# Patient Record
Sex: FEMALE | Race: WHITE | URBAN - METROPOLITAN AREA
[De-identification: names, ages, dates, MRNs, and addresses within clinical notes are randomized per-mention and may not be internally consistent; named-entity substitution may affect disease eponyms.]

---

## 2017-03-20 ENCOUNTER — INPATIENT (INPATIENT)
Facility: HOSPITAL | Age: 42
LOS: 0 days | Discharge: HOME | DRG: 313 | End: 2017-03-21
Attending: HOSPITALIST | Admitting: HOSPITALIST
Payer: MEDICAID

## 2017-03-20 VITALS
TEMPERATURE: 98 F | HEART RATE: 71 BPM | DIASTOLIC BLOOD PRESSURE: 77 MMHG | RESPIRATION RATE: 18 BRPM | WEIGHT: 143.08 LBS | HEIGHT: 60 IN | SYSTOLIC BLOOD PRESSURE: 115 MMHG | OXYGEN SATURATION: 100 %

## 2017-03-20 DIAGNOSIS — R07.9 CHEST PAIN, UNSPECIFIED: ICD-10-CM

## 2017-03-20 DIAGNOSIS — R07.89 OTHER CHEST PAIN: ICD-10-CM

## 2017-03-20 DIAGNOSIS — E66.9 OBESITY, UNSPECIFIED: ICD-10-CM

## 2017-03-20 DIAGNOSIS — K29.70 GASTRITIS, UNSPECIFIED, WITHOUT BLEEDING: ICD-10-CM

## 2017-03-20 DIAGNOSIS — E66.8 OTHER OBESITY: ICD-10-CM

## 2017-03-20 DIAGNOSIS — Z79.01 LONG TERM (CURRENT) USE OF ANTICOAGULANTS: ICD-10-CM

## 2017-03-20 LAB
ALBUMIN SERPL ELPH-MCNC: 4.2 G/DL — SIGNIFICANT CHANGE UP (ref 3.3–5.2)
ALP SERPL-CCNC: 78 U/L — SIGNIFICANT CHANGE UP (ref 40–120)
ALT FLD-CCNC: 41 U/L — HIGH
ANION GAP SERPL CALC-SCNC: 12 MMOL/L — SIGNIFICANT CHANGE UP (ref 5–17)
APTT BLD: 27.9 SEC — SIGNIFICANT CHANGE UP (ref 27.5–37.4)
AST SERPL-CCNC: 26 U/L — SIGNIFICANT CHANGE UP
BILIRUB SERPL-MCNC: 0.2 MG/DL — LOW (ref 0.4–2)
BUN SERPL-MCNC: 10 MG/DL — SIGNIFICANT CHANGE UP (ref 8–20)
CALCIUM SERPL-MCNC: 9 MG/DL — SIGNIFICANT CHANGE UP (ref 8.6–10.2)
CHLORIDE SERPL-SCNC: 101 MMOL/L — SIGNIFICANT CHANGE UP (ref 98–107)
CK SERPL-CCNC: 115 U/L — SIGNIFICANT CHANGE UP (ref 25–170)
CO2 SERPL-SCNC: 26 MMOL/L — SIGNIFICANT CHANGE UP (ref 22–29)
CREAT SERPL-MCNC: 0.51 MG/DL — SIGNIFICANT CHANGE UP (ref 0.5–1.3)
GLUCOSE SERPL-MCNC: 89 MG/DL — SIGNIFICANT CHANGE UP (ref 70–115)
HCT VFR BLD CALC: 41.8 % — SIGNIFICANT CHANGE UP (ref 37–47)
HGB BLD-MCNC: 13.4 G/DL — SIGNIFICANT CHANGE UP (ref 12–16)
INR BLD: 1.07 RATIO — SIGNIFICANT CHANGE UP (ref 0.88–1.16)
MCHC RBC-ENTMCNC: 25.5 PG — LOW (ref 27–31)
MCHC RBC-ENTMCNC: 32.1 G/DL — SIGNIFICANT CHANGE UP (ref 32–36)
MCV RBC AUTO: 79.6 FL — LOW (ref 81–99)
PLATELET # BLD AUTO: 251 K/UL — SIGNIFICANT CHANGE UP (ref 150–400)
POTASSIUM SERPL-MCNC: 3.9 MMOL/L — SIGNIFICANT CHANGE UP (ref 3.5–5.3)
POTASSIUM SERPL-SCNC: 3.9 MMOL/L — SIGNIFICANT CHANGE UP (ref 3.5–5.3)
PROT SERPL-MCNC: 8 G/DL — SIGNIFICANT CHANGE UP (ref 6.6–8.7)
PROTHROM AB SERPL-ACNC: 11.8 SEC — SIGNIFICANT CHANGE UP (ref 10–13.1)
RBC # BLD: 5.25 M/UL — HIGH (ref 4.4–5.2)
RBC # FLD: 14.5 % — SIGNIFICANT CHANGE UP (ref 11–15.6)
SODIUM SERPL-SCNC: 139 MMOL/L — SIGNIFICANT CHANGE UP (ref 135–145)
TROPONIN T SERPL-MCNC: <0.01 NG/ML — SIGNIFICANT CHANGE UP (ref 0–0.06)
WBC # BLD: 7.6 K/UL — SIGNIFICANT CHANGE UP (ref 4.8–10.8)
WBC # FLD AUTO: 7.6 K/UL — SIGNIFICANT CHANGE UP (ref 4.8–10.8)

## 2017-03-20 PROCEDURE — 99285 EMERGENCY DEPT VISIT HI MDM: CPT

## 2017-03-20 PROCEDURE — 71010: CPT | Mod: 26

## 2017-03-20 PROCEDURE — 93010 ELECTROCARDIOGRAM REPORT: CPT

## 2017-03-20 PROCEDURE — 99223 1ST HOSP IP/OBS HIGH 75: CPT | Mod: GC

## 2017-03-20 RX ORDER — ASPIRIN/CALCIUM CARB/MAGNESIUM 324 MG
325 TABLET ORAL ONCE
Qty: 0 | Refills: 0 | Status: COMPLETED | OUTPATIENT
Start: 2017-03-20 | End: 2017-03-20

## 2017-03-20 RX ORDER — NITROGLYCERIN 6.5 MG
1 CAPSULE, EXTENDED RELEASE ORAL ONCE
Qty: 0 | Refills: 0 | Status: COMPLETED | OUTPATIENT
Start: 2017-03-20 | End: 2017-03-20

## 2017-03-20 RX ORDER — ACETAMINOPHEN 500 MG
650 TABLET ORAL EVERY 6 HOURS
Qty: 0 | Refills: 0 | Status: DISCONTINUED | OUTPATIENT
Start: 2017-03-20 | End: 2017-03-21

## 2017-03-20 RX ORDER — ENOXAPARIN SODIUM 100 MG/ML
40 INJECTION SUBCUTANEOUS EVERY 24 HOURS
Qty: 0 | Refills: 0 | Status: DISCONTINUED | OUTPATIENT
Start: 2017-03-20 | End: 2017-03-21

## 2017-03-20 RX ORDER — PANTOPRAZOLE SODIUM 20 MG/1
40 TABLET, DELAYED RELEASE ORAL
Qty: 0 | Refills: 0 | Status: DISCONTINUED | OUTPATIENT
Start: 2017-03-20 | End: 2017-03-21

## 2017-03-20 RX ADMIN — ENOXAPARIN SODIUM 40 MILLIGRAM(S): 100 INJECTION SUBCUTANEOUS at 18:28

## 2017-03-20 RX ADMIN — PANTOPRAZOLE SODIUM 40 MILLIGRAM(S): 20 TABLET, DELAYED RELEASE ORAL at 18:28

## 2017-03-20 RX ADMIN — Medication 650 MILLIGRAM(S): at 18:28

## 2017-03-20 RX ADMIN — Medication 325 MILLIGRAM(S): at 14:09

## 2017-03-20 NOTE — ED ADULT NURSE NOTE - OBJECTIVE STATEMENT
41 year old female, no acute distress, vss, complains of Left chest and Left arm pain x one week. Pain increases with lifting of left arm. Hospital  Yvan Jacques at bedside to translate. Pt place on cardiac and O2 monitor, fall precautions intiti

## 2017-03-20 NOTE — H&P ADULT - NSHPPHYSICALEXAM_GEN_ALL_CORE
Constitutional: Pt in mild distress, favoring left arm    HEAD: NC/AT    HEENT: EYES: EOMI, PERRLA; Throat: no erythema, normal appearing, no lesions, multiple fillings, moist mucous membranes    Neck:  No JVD, bruits or thyromegaly    Respiratory:  Clear without rales or rhonchi    Cardiovascular:  RR without murmur, rub or gallop.    Gastrointestinal: Soft without hepatosplenomegaly.    Extremities: without cyanosis, clubbing or edema.    Neurological:  Alert and Oriented   x     3 . CN2-12 intact. No gross sensory or motor defects.    Skin:    Psychiatric: Normal affect. Constitutional: Pt in mild distress, favoring left arm    HEAD: NC/AT    HEENT: EYES: EOMI, PERRLA; Throat: no erythema, normal appearing, no lesions, multiple fillings, moist mucous membranes    Neck:  No JVD, bruits or thyromegaly    Respiratory:  Clear without rales or rhonchi    Cardiovascular:  RR without murmur, rub or gallop.    Gastrointestinal: Soft without hepatosplenomegaly.    Extremities: without cyanosis, clubbing or edema.    Neurological:  Alert and Oriented   x     3 . CN2-12 intact. No gross sensory or motor defects.    Skin: no lesions    Psychiatric: Normal affect.

## 2017-03-20 NOTE — H&P ADULT - PROBLEM SELECTOR PLAN 1
Admit to: Dr Alamo's medicine service  Bed: any monitored bed  Diet: low sodium, low cholesterol  Activity: as tolerated  Vitals: as per routine  Labs: CBC, CMP, Troponins x3, CKMB, Mg, Phos, PT/INR, PTT, Lipid panel in AM, TSH in AM  Rx: Aspirin, Nitroglycerin 2% patch, Tylenol PRN pain  EKG  Imaging: CXR, TTE

## 2017-03-20 NOTE — ED PROVIDER NOTE - MEDICAL DECISION MAKING DETAILS
The patient sent in from the  clinic for admission for cardiac work-up.  The symptoms concerning for ACS

## 2017-03-20 NOTE — H&P ADULT - FAMILY HISTORY
Mother  Still living? Unknown  Family history of hypertension, Age at diagnosis: Age Unknown  Family history of diabetes mellitus in mother, Age at diagnosis: Age Unknown  Family history of hypercholesterolemia, Age at diagnosis: Age Unknown

## 2017-03-20 NOTE — ED PROVIDER NOTE - OBJECTIVE STATEMENT
The patient is a 41 year old female sent in from [facility] clinic complaining of CP described as pressure and radiating to L arm.  The symptom started one week ago.  The pain is on and off and today is worsening.  The patient also complaining of dizziness and mild nausea. No fever, No back pain, No HA

## 2017-03-20 NOTE — H&P ADULT - HISTORY OF PRESENT ILLNESS
42 yo female, presented at the [facility] clinic in [place] with chest pain. Pain is 10/10, in the left chest and radiates down her left arm, pressure like in character, intermittent, started 2 weeks ago but worsened today, alleviated previously with tylenol, exacerbated with movement. Pt reports that she has been having the CP even while at rest. It is associated with dizziness, subjective sweating, nausea, vomiting x1 this AM and mild HA 5/10. Pt denies visual s/s, palpitations, SOB, fever, chills.

## 2017-03-20 NOTE — ED PROVIDER NOTE - CHPI ED SYMPTOMS NEG
no chills/no fever/no syncope/no shortness of breath/no vomiting/no cough/no back pain/no diaphoresis

## 2017-03-20 NOTE — ED ADULT NURSE NOTE - LANGUAGE ASSISTANCE NEEDED
Yes-Patient/Caregiver accepts free interpretation services... Yes-Patient/Caregiver accepts free interpretation services.../Hospital  Yvan Jacques

## 2017-03-21 VITALS
RESPIRATION RATE: 18 BRPM | HEART RATE: 80 BPM | DIASTOLIC BLOOD PRESSURE: 59 MMHG | TEMPERATURE: 98 F | SYSTOLIC BLOOD PRESSURE: 102 MMHG | OXYGEN SATURATION: 99 %

## 2017-03-21 LAB
ALBUMIN SERPL ELPH-MCNC: 4.1 G/DL — SIGNIFICANT CHANGE UP (ref 3.3–5.2)
ALP SERPL-CCNC: 73 U/L — SIGNIFICANT CHANGE UP (ref 40–120)
ALT FLD-CCNC: 35 U/L — HIGH
ANION GAP SERPL CALC-SCNC: 11 MMOL/L — SIGNIFICANT CHANGE UP (ref 5–17)
AST SERPL-CCNC: 24 U/L — SIGNIFICANT CHANGE UP
BASOPHILS # BLD AUTO: 0 K/UL — SIGNIFICANT CHANGE UP (ref 0–0.2)
BASOPHILS NFR BLD AUTO: 0.4 % — SIGNIFICANT CHANGE UP (ref 0–2)
BILIRUB SERPL-MCNC: 0.4 MG/DL — SIGNIFICANT CHANGE UP (ref 0.4–2)
BUN SERPL-MCNC: 13 MG/DL — SIGNIFICANT CHANGE UP (ref 8–20)
CALCIUM SERPL-MCNC: 8.8 MG/DL — SIGNIFICANT CHANGE UP (ref 8.6–10.2)
CHLORIDE SERPL-SCNC: 102 MMOL/L — SIGNIFICANT CHANGE UP (ref 98–107)
CHOLEST SERPL-MCNC: 152 MG/DL — SIGNIFICANT CHANGE UP (ref 110–199)
CK SERPL-CCNC: 80 U/L — SIGNIFICANT CHANGE UP (ref 25–170)
CK SERPL-CCNC: 92 U/L — SIGNIFICANT CHANGE UP (ref 25–170)
CO2 SERPL-SCNC: 27 MMOL/L — SIGNIFICANT CHANGE UP (ref 22–29)
CREAT SERPL-MCNC: 0.57 MG/DL — SIGNIFICANT CHANGE UP (ref 0.5–1.3)
EOSINOPHIL # BLD AUTO: 0.3 K/UL — SIGNIFICANT CHANGE UP (ref 0–0.5)
EOSINOPHIL NFR BLD AUTO: 2.5 % — SIGNIFICANT CHANGE UP (ref 0–6)
GLUCOSE SERPL-MCNC: 83 MG/DL — SIGNIFICANT CHANGE UP (ref 70–115)
HAV IGM SER-ACNC: SIGNIFICANT CHANGE UP
HBA1C BLD-MCNC: 5.5 — SIGNIFICANT CHANGE UP (ref 4–5.6)
HBV CORE IGM SER-ACNC: SIGNIFICANT CHANGE UP
HBV SURFACE AG SER-ACNC: SIGNIFICANT CHANGE UP
HCT VFR BLD CALC: 41.5 % — SIGNIFICANT CHANGE UP (ref 37–47)
HCV AB S/CO SERPL IA: 0.18 S/CO — SIGNIFICANT CHANGE UP
HCV AB SERPL-IMP: SIGNIFICANT CHANGE UP
HDLC SERPL-MCNC: 47 MG/DL — LOW
HGB BLD-MCNC: 13.5 G/DL — SIGNIFICANT CHANGE UP (ref 12–16)
LIPID PNL WITH DIRECT LDL SERPL: 77 MG/DL — SIGNIFICANT CHANGE UP
LYMPHOCYTES # BLD AUTO: 16.5 % — LOW (ref 20–55)
LYMPHOCYTES # BLD AUTO: 2.2 K/UL — SIGNIFICANT CHANGE UP (ref 1–4.8)
MAGNESIUM SERPL-MCNC: 2.2 MG/DL — SIGNIFICANT CHANGE UP (ref 1.8–2.5)
MCHC RBC-ENTMCNC: 25.9 PG — LOW (ref 27–31)
MCHC RBC-ENTMCNC: 32.5 G/DL — SIGNIFICANT CHANGE UP (ref 32–36)
MCV RBC AUTO: 79.5 FL — LOW (ref 81–99)
MONOCYTES # BLD AUTO: 1.2 K/UL — HIGH (ref 0–0.8)
MONOCYTES NFR BLD AUTO: 9.6 % — SIGNIFICANT CHANGE UP (ref 3–10)
NEUTROPHILS # BLD AUTO: 9.2 K/UL — HIGH (ref 1.8–8)
NEUTROPHILS NFR BLD AUTO: 70.7 % — SIGNIFICANT CHANGE UP (ref 37–73)
PLATELET # BLD AUTO: 244 K/UL — SIGNIFICANT CHANGE UP (ref 150–400)
POTASSIUM SERPL-MCNC: 3.7 MMOL/L — SIGNIFICANT CHANGE UP (ref 3.5–5.3)
POTASSIUM SERPL-SCNC: 3.7 MMOL/L — SIGNIFICANT CHANGE UP (ref 3.5–5.3)
PROT SERPL-MCNC: 7.5 G/DL — SIGNIFICANT CHANGE UP (ref 6.6–8.7)
RBC # BLD: 5.22 M/UL — HIGH (ref 4.4–5.2)
RBC # FLD: 14.4 % — SIGNIFICANT CHANGE UP (ref 11–15.6)
SODIUM SERPL-SCNC: 140 MMOL/L — SIGNIFICANT CHANGE UP (ref 135–145)
TOTAL CHOLESTEROL/HDL RATIO MEASUREMENT: 3 RATIO — LOW (ref 3.3–7.1)
TRIGL SERPL-MCNC: 140 MG/DL — SIGNIFICANT CHANGE UP (ref 10–200)
TROPONIN T SERPL-MCNC: <0.01 NG/ML — SIGNIFICANT CHANGE UP (ref 0–0.06)
TROPONIN T SERPL-MCNC: <0.01 NG/ML — SIGNIFICANT CHANGE UP (ref 0–0.06)
WBC # BLD: 13 K/UL — HIGH (ref 4.8–10.8)
WBC # FLD AUTO: 13 K/UL — HIGH (ref 4.8–10.8)

## 2017-03-21 PROCEDURE — 80061 LIPID PANEL: CPT

## 2017-03-21 PROCEDURE — 99285 EMERGENCY DEPT VISIT HI MDM: CPT | Mod: 25

## 2017-03-21 PROCEDURE — 85027 COMPLETE CBC AUTOMATED: CPT

## 2017-03-21 PROCEDURE — T1013: CPT

## 2017-03-21 PROCEDURE — 80074 ACUTE HEPATITIS PANEL: CPT

## 2017-03-21 PROCEDURE — 82550 ASSAY OF CK (CPK): CPT

## 2017-03-21 PROCEDURE — 83735 ASSAY OF MAGNESIUM: CPT

## 2017-03-21 PROCEDURE — 85730 THROMBOPLASTIN TIME PARTIAL: CPT

## 2017-03-21 PROCEDURE — 83036 HEMOGLOBIN GLYCOSYLATED A1C: CPT

## 2017-03-21 PROCEDURE — 93010 ELECTROCARDIOGRAM REPORT: CPT

## 2017-03-21 PROCEDURE — 36415 COLL VENOUS BLD VENIPUNCTURE: CPT

## 2017-03-21 PROCEDURE — 84484 ASSAY OF TROPONIN QUANT: CPT

## 2017-03-21 PROCEDURE — 85610 PROTHROMBIN TIME: CPT

## 2017-03-21 PROCEDURE — 71045 X-RAY EXAM CHEST 1 VIEW: CPT

## 2017-03-21 PROCEDURE — 93005 ELECTROCARDIOGRAM TRACING: CPT

## 2017-03-21 PROCEDURE — 80053 COMPREHEN METABOLIC PANEL: CPT

## 2017-03-21 PROCEDURE — 99232 SBSQ HOSP IP/OBS MODERATE 35: CPT | Mod: GC

## 2017-03-21 RX ORDER — PANTOPRAZOLE SODIUM 20 MG/1
1 TABLET, DELAYED RELEASE ORAL
Qty: 0 | Refills: 0 | COMMUNITY
Start: 2017-03-21

## 2017-03-21 RX ORDER — ACETAMINOPHEN 500 MG
2 TABLET ORAL
Qty: 0 | Refills: 0 | COMMUNITY
Start: 2017-03-21

## 2017-03-21 RX ORDER — METHOCARBAMOL 500 MG/1
1 TABLET, FILM COATED ORAL
Qty: 18 | Refills: 0 | OUTPATIENT
Start: 2017-03-21 | End: 2017-03-24

## 2017-03-21 RX ORDER — ACETAMINOPHEN 500 MG
2 TABLET ORAL
Qty: 240 | Refills: 0 | OUTPATIENT
Start: 2017-03-21 | End: 2017-04-20

## 2017-03-21 RX ORDER — METHOCARBAMOL 500 MG/1
1 TABLET, FILM COATED ORAL
Qty: 30 | Refills: 0 | OUTPATIENT
Start: 2017-03-21 | End: 2017-03-26

## 2017-03-21 RX ORDER — PANTOPRAZOLE SODIUM 20 MG/1
1 TABLET, DELAYED RELEASE ORAL
Qty: 30 | Refills: 0 | OUTPATIENT
Start: 2017-03-21 | End: 2017-04-20

## 2017-03-21 RX ADMIN — Medication 650 MILLIGRAM(S): at 03:55

## 2017-03-21 RX ADMIN — PANTOPRAZOLE SODIUM 40 MILLIGRAM(S): 20 TABLET, DELAYED RELEASE ORAL at 08:34

## 2017-03-21 NOTE — ED ADULT NURSE REASSESSMENT NOTE - NS ED NURSE REASSESS COMMENT FT1
Pt resting comfortably in bed, pt denies any chest pain or discomfort. Pt awaiting bed, scheduled for CT spine. will continue to monitor.

## 2017-03-21 NOTE — DISCHARGE NOTE ADULT - PROVIDER TOKENS
FREE:[LAST:[HRH],FIRST:[Clinic],PHONE:[(   )    -],FAX:[(   )    -],ADDRESS:[[facility] [place] [address]    Phone: [phone]  Fax: [phone]]]

## 2017-03-21 NOTE — PROGRESS NOTE ADULT - PROBLEM SELECTOR PLAN 1
NL EKG on admission, repeat AM EKG pending  (-) troponins x 2 , 3rd trop is pending   CKMB (-) x 2, 3rd is pending  Vitals WNL and CP resolved  c/w acetaminophen   Tablet. 650milliGRAM(s) Oral every 6 hours PRN pain

## 2017-03-21 NOTE — ED ADULT NURSE REASSESSMENT NOTE - NS ED NURSE REASSESS COMMENT FT1
Pt is Aox3 in NAD. Pt denies complaint.  normal sinus on monirot in the 70s. IV clean dry and intact. Pt OOB independently. Safety maintained, Bed locked in lowest position. Call bell in reach. Pt awaiting bed placement.

## 2017-03-21 NOTE — DISCHARGE NOTE ADULT - CARE PLAN
Principal Discharge DX:	Musculoskeletal chest pain  Instructions for follow-up, activity and diet:	resolved with Tylenol. Take medications as instructed. Please follow up out patient at the [facility] at [place] if pain returns. If chest pain radiating down the left arm or left jaw returns, please take a dose of aspirin 325mg and seek medical assistance.  Secondary Diagnosis:	Gastritis  Instructions for follow-up, activity and diet:	no episodes of gastric pain during admission. Please follow up out patient at the [facility] at [place] if pain returns  Secondary Diagnosis:	Moderate obesity  Instructions for follow-up, activity and diet:	please decrease fat in diet, low fat diet is encouraged. Increase eating fruits and vegetable. Increase exercise. Principal Discharge DX:	Musculoskeletal chest pain  Goal:	Able to tolerate pain  Instructions for follow-up, activity and diet:	resolved with Tylenol. Take medications as instructed. Please follow up out patient at the [facility] at [place] if pain returns. If chest pain radiating down the left arm or left jaw returns, please take a dose of aspirin 325mg and seek medical assistance.  Secondary Diagnosis:	Gastritis  Instructions for follow-up, activity and diet:	no episodes of gastric pain during admission. Please follow up out patient at the [facility] at [place] if pain returns  Secondary Diagnosis:	Moderate obesity  Instructions for follow-up, activity and diet:	please decrease fat in diet, low fat diet is encouraged. Increase eating fruits and vegetable. Increase exercise.

## 2017-03-21 NOTE — ED ADULT NURSE REASSESSMENT NOTE - NS ED NURSE REASSESS COMMENT FT1
PT resting comfortably in NAD. Pt ambulated with a steady gait to the bathroom. Pt placed on monitor pt normal sinus 60s. Pt denies complaint. pt awaiting bed placement. safety maintain.

## 2017-03-21 NOTE — PROGRESS NOTE ADULT - ASSESSMENT
41y  Female with no PMHX, presented with CP at [facility] clinic and was sent to [facility], is admitted for R/O ACS. No events overnight, pt has no complaints, CP has resolved and Left Upper Extremity pain is currently 3/10 and located at the posterior shoulder. Pt is ambulating and denies, CP, SOB, nausea, vomiting, fever, chills, diarrhea, constipation.

## 2017-03-21 NOTE — DISCHARGE NOTE ADULT - HOSPITAL COURSE
41y  Female with no PMHX, presented with CP at [facility] clinic and was sent to [facility], was admitted for R/O ACS. NL EKG on admission, repeat AM EKG pending, (-) troponins x 2 , 3rd trop is pending, CKMB (-) x 2, 3rd is pending. Vitals overnight were WNL and CP resolved. Pt on admission complained of intermittent gastric pain and was treated with pantoprazole with no episodes of epigastric pain since. Pt is noted to be Moderately Obese with a BMI of 27.9. Pt was advised and encouraged to comply with diet and exercise. No events overnight, pt has no complaints, CP has resolved and Left Upper Extremity pain is currently 3/10 and located at the posterior shoulder. Pt is ambulating and denies, CP, SOB, nausea, vomiting, fever, chills, diarrhea, constipation. CP is likely to musculoskeletal in etiology and pt refuses further imaging for evaluation. Pt is stable and optimized for discharge and advised to follow up with her clinic stevo week for a post -hospital visit.

## 2017-03-21 NOTE — DISCHARGE NOTE ADULT - PATIENT PORTAL LINK FT
“You can access the Follow[employer] Patient Portal, offered by [facility], by registering with the following website: [email or web address]”

## 2017-03-21 NOTE — DISCHARGE NOTE ADULT - NS AS ACTIVITY OBS
Bathing allowed/Walking-Indoors allowed/Stairs allowed/Return to Work/School allowed/Driving allowed/Showering allowed/Walking-Outdoors allowed/Sex allowed

## 2017-03-21 NOTE — DISCHARGE NOTE ADULT - PLAN OF CARE
please decrease fat in diet, low fat diet is encouraged. Increase eating fruits and vegetable. Increase exercise. no episodes of gastric pain during admission. Please follow up out patient at the [facility] at [place] if pain returns resolved with Tylenol. Take medications as instructed. Please follow up out patient at the [facility] at [place] if pain returns. If chest pain radiating down the left arm or left jaw returns, please take a dose of aspirin 325mg and seek medical assistance. Able to tolerate pain

## 2017-03-21 NOTE — PROGRESS NOTE ADULT - SUBJECTIVE AND OBJECTIVE BOX
Patient is a 41y old  Female who presents with a chief complaint of Chest pain (20 Mar 2017 15:03)      INTERVAL HPI/OVERNIGHT EVENTS:  41y  Female with no PMHX, presented with CP at [facility] clinic and was sent to [facility], is admitted for R/O ACS. No events overnight, pt has no complaints, CP has resolved and Left Upper Extremity pain is currently 3/10 and located at the posterior shoulder. Pt is ambulating and denies, CP, SOB, nausea, vomiting, fever, chills, diarrhea, constipation.          Vital Signs Last 24 Hrs  T(C): 36.6, Max: 36.9 (03-20 @ 20:11)  T(F): 97.9, Max: 98.5 (03-20 @ 20:11)  HR: 76 (64 - 91)  BP: 107/55 (87/57 - 115/77)  BP(mean): --  RR: 18 (15 - 18)  SpO2: 100% (97% - 100%)      Daily Height in cm: 152.4 (20 Mar 2017 11:16)    Daily   I&O's Detail        REVIEW OF SYSTEMS:    negative except as noted above    PHYSICAL EXAM:    GENERAL: NAD, obese, well-groomed, well-developed  HEAD:  Atraumatic, Normocephalic  EYES: EOMI, PERRLA, conjunctiva and sclera clear  ENMT: No tonsillar erythema, exudates, or enlargement; Moist mucous membranes, Good dentition, No lesions  NECK: Supple, No JVD, Normal thyroid  NERVOUS SYSTEM:  Alert & Oriented X3, Good concentration; Motor Strength 5/5 B/L upper and lower extremities; DTRs 2+ intact and symmetric  CHEST/LUNG: Clear to percussion bilaterally; No rales, rhonchi, wheezing, or rubs  HEART: Regular rate and rhythm; No murmurs, rubs, or gallops  ABDOMEN: Soft, Nontender, Nondistended; Bowel sounds present  EXTREMITIES:  mild left shoulder pain, 2+ Peripheral Pulses, No clubbing, cyanosis, or edema  LYMPH: No lymphadenopathy noted  SKIN: No rashes or lesions      LABS:                        13.5   13.0  )-----------( 244      ( 21 Mar 2017 04:23 )             41.5     CBC Full  -  ( 21 Mar 2017 04:23 )  WBC Count : 13.0 K/uL  Hemoglobin : 13.5 g/dL  Hematocrit : 41.5 %  Platelet Count - Automated : 244 K/uL  Mean Cell Volume : 79.5 fl  Mean Cell Hemoglobin : 25.9 pg  Mean Cell Hemoglobin Concentration : 32.5 g/dL  Auto Neutrophil # : 9.2 K/uL  Auto Lymphocyte # : 2.2 K/uL  Auto Monocyte # : 1.2 K/uL  Auto Eosinophil # : 0.3 K/uL  Auto Basophil # : 0.0 K/uL  Auto Neutrophil % : 70.7 %  Auto Lymphocyte % : 16.5 %  Auto Monocyte % : 9.6 %  Auto Eosinophil % : 2.5 %  Auto Basophil % : 0.4 %    21 Mar 2017 04:23    140    |  102    |  13.0   ----------------------------<  83     3.7     |  27.0   |  0.57     Ca    8.8        21 Mar 2017 04:23  Mg     2.2       21 Mar 2017 04:23    TPro  7.5    /  Alb  4.1    /  TBili  0.4    /  DBili  x      /  AST  24     /  ALT  35     /  AlkPhos  73     21 Mar 2017 04:23    PT/INR - ( 20 Mar 2017 11:34 )   PT: 11.8 sec;   INR: 1.07 ratio         PTT - ( 20 Mar 2017 11:34 )  PTT:27.9 sec    Hemoglobin A1C, Whole Blood: 5.5 (03-21 @ 04:24)          Albumin, Serum: 4.1 g/dL (03-21 @ 04:23)  Albumin, Serum: 4.2 g/dL (03-20 @ 11:34)    LIVER FUNCTIONS - ( 21 Mar 2017 04:23 )  Alb: 4.1 g/dL / Pro: 7.5 g/dL / ALK PHOS: 73 U/L / ALT: 35 U/L / AST: 24 U/L / GGT: x             RADIOLOGY & ADDITIONAL TESTS:    MEDICATIONS    NEUROLOGIC:  acetaminophen   Tablet. 650milliGRAM(s) Oral every 6 hours PRN    ONCOLOGIC:    HEMATOLOGIC:  enoxaparin Injectable 40milliGRAM(s) SubCutaneous every 24 hours    GATROINTESTINAL:  pantoprazole    Tablet 40milliGRAM(s) Oral before breakfast     MEDS:    ENDO/METABOLIC:    IV FLUID/NUTRITION:    TOPICAL:    IMMUNOLOGIC & OTHER      Allergies    No Known Allergies    Intolerances

## 2017-03-21 NOTE — DISCHARGE NOTE ADULT - ADDITIONAL INSTRUCTIONS
Please follow up out patient at the [facility] at [place] in 1 week for pot- ED visit.

## 2017-03-21 NOTE — PROGRESS NOTE ADULT - PROBLEM SELECTOR PLAN 2
no episodes of epigastric pain overnight  c/w pantoprazole  Tablet 40milliGRAM(s) Oral before breakfast

## 2017-08-30 ENCOUNTER — ASOB RESULT (OUTPATIENT)
Age: 42
End: 2017-08-30

## 2017-08-30 ENCOUNTER — APPOINTMENT (OUTPATIENT)
Dept: ANTEPARTUM | Facility: CLINIC | Age: 42
End: 2017-08-30
Payer: SELF-PAY

## 2017-08-30 PROBLEM — Z00.00 ENCOUNTER FOR PREVENTIVE HEALTH EXAMINATION: Status: ACTIVE | Noted: 2017-08-30

## 2017-08-30 PROCEDURE — 76857 US EXAM PELVIC LIMITED: CPT

## 2017-08-30 PROCEDURE — 76830 TRANSVAGINAL US NON-OB: CPT

## 2019-12-23 NOTE — ED PROVIDER NOTE - ENMT, MLM
Called patient to let her know  is in surgery this morning. Patient is going to rescheduled her appointment through the portal.    Airway patent, Nasal mucosa clear. Mouth with normal mucosa. Throat has no vesicles, no oropharyngeal exudates and uvula is midline.

## 2021-07-29 NOTE — ED ADULT TRIAGE NOTE - LANGUAGE ASSISTANCE NEEDED
Pt is requesting a referral to see an OBGYN for Hormone Replacement Therapy.    Spoke with Dr. Mast, will come melissa pt Yes-Patient/Caregiver accepts free interpretation services... Per psych, pt cleared. Psych offered inpatient rehab which pt refused. Will dc with outpt mental health follow up

## 2022-02-09 NOTE — DISCHARGE NOTE ADULT - MEDICATION SUMMARY - MEDICATIONS TO TAKE
Alert-The patient is alert, awake and responds to voice. The patient is oriented to time, place, and person. The triage nurse is able to obtain subjective information.
I will START or STAY ON the medications listed below when I get home from the hospital:    acetaminophen 325 mg oral tablet  -- 2 tab(s) by mouth every 6 hours, As needed, Moderate Pain (4 - 6)  -- Indication: For Pain    methocarbamol 750 mg oral tablet  -- 1 tab(s) by mouth every 4 hours  -- May cause drowsiness.  Alcohol may intensify this effect.  Use care when operating dangerous machinery.    -- Indication: For Musculoskeletal chest pain    pantoprazole 40 mg oral delayed release tablet  -- 1 tab(s) by mouth once a day (before a meal)  -- Indication: For Gastritis

## 2022-07-25 NOTE — ED ADULT NURSE NOTE - CARDIO ASSESSMENT
Problem: Risk for Delirium  Goal: Improved Sleep  Outcome: Ongoing, Progressing     Problem: Chest Pain  Goal: Resolution of Chest Pain Symptoms  Outcome: Ongoing, Progressing     Patient had uneventful shift.  Slept.  VSS.  On Nitroglycerine gtt at 40 mcg/min.  States she is pain free.  Heparin gtt running at 800 units/hr.  Next Anti-XA will be at 10:00 a.m.  Pt. Has been NPO since midnight as she will be transferred to [facility] this morning for an angiogram.  All cares performed as ordered and explained.  Will continue to monitor and follow plan of care.                              
"Problem: Risk for Delirium  Goal: Improved Sleep  Outcome: Ongoing, Progressing     Problem: Chest Pain  Goal: Resolution of Chest Pain Symptoms  Intervention: Manage Acute Chest Pain  Patient transferred to ICU status to be started on a Nitroglycerine gtt.  Stated that her chest pain was midsternal and rated the pressure a \"3\" on a scale of 0-10.  Nitroglycerine drip titrated to 30 mcg/min.  Patient then rated her pain a \"1\" and then fell asleep shortly afterwards.  HR bradycardic in low 50's.  Systolic blood pressures remain in low 100's.  Heparin drip running at 850 units/hr per the protocol.  Next Anti-XA draw will be at 11:45 a.m.  Patient has been NPO since 0200 for a possible angio today.  She will also be having an echocardiogram this morning.                               "
Discharge Note-     8:25A-- Transferring to [facility] for angiogram. Denies chest pain, on 2LNC with SpO2 = 90%. Has been NPO since midnight. Sign out given to RN resuming care.     Current drips-- Nitroglycerin, heparin.   
WDL

## 2023-02-22 NOTE — DISCHARGE NOTE ADULT - FUNCTIONAL SCREEN CURRENT LEVEL: BATHING, MLM
(0) independent Simple Simulation Preamble Text Will Be Included With Simple Simulations (.......... Indications): Simple simulation was performed today for the following reasons:

## 2025-04-25 NOTE — DISCHARGE NOTE ADULT - CARE PROVIDER_API CALL
Cigarettes
[facility], [place] [address]    Phone: [phone]  Fax: [phone]  Phone: (   )    -  Fax: (   )    -